# Patient Record
Sex: FEMALE
[De-identification: names, ages, dates, MRNs, and addresses within clinical notes are randomized per-mention and may not be internally consistent; named-entity substitution may affect disease eponyms.]

---

## 2022-01-24 ENCOUNTER — HOSPITAL ENCOUNTER (EMERGENCY)
Dept: HOSPITAL 5 - ED | Age: 32
Discharge: HOME | End: 2022-01-24
Payer: SELF-PAY

## 2022-01-24 VITALS — DIASTOLIC BLOOD PRESSURE: 81 MMHG | SYSTOLIC BLOOD PRESSURE: 145 MMHG

## 2022-01-24 DIAGNOSIS — J45.909: Primary | ICD-10-CM

## 2022-01-24 PROCEDURE — 99282 EMERGENCY DEPT VISIT SF MDM: CPT

## 2022-01-24 NOTE — EMERGENCY DEPARTMENT REPORT
ED Shortness of Breath HPI





- General


Chief Complaint: Dyspnea/Respdistress


Stated Complaint: SOB


Time Seen by Provider: 01/24/22 17:50


Source: patient


Mode of arrival: Ambulatory


Limitations: No Limitations





- History of Present Illness


Initial Comments: 





Patient is a 31-year-old F American female with past medical history of possible

bronchitis versus asthma who is presenting with 2 months of cough which is 

nonproductive and shortness of breath and some mild wheezing.  Symptoms occur 

intermittently but worsened today which prompted her to come to the hospital.  

She denies any fevers chills nausea vomiting diarrhea.  Patient states she did 

take a Covid test yesterday which was negative.  Patient states she no longer 

has an inhaler which did help her symptoms.





- Related Data


                                  Previous Rx's











 Medication  Instructions  Recorded  Last Taken  Type


 


Albuterol Mdi (or & Nicu Only) 2 puff IH QID PRN #1 inhalation 01/24/22 Unknown 

Rx





[ProAir HFA Inhaler]    


 


Benzonatate [Tessalon Perles] 100 mg PO Q8HR #10 capsule 01/24/22 Unknown Rx


 


predniSONE [Deltasone] 50 mg PO QDAY #5 tab 01/24/22 Unknown Rx











                                    Allergies











Allergy/AdvReac Type Severity Reaction Status Date / Time


 


No Known Allergies Allergy   Verified 01/24/22 16:31














ED Review of Systems


ROS: 


Stated complaint: SOB


Other details as noted in HPI





Comment: All other systems reviewed and negative





ED Past Medical Hx





- Past Medical History


Previous Medical History?: No





- Surgical History


Past Surgical History?: No





- Medications


Home Medications: 


                                Home Medications











 Medication  Instructions  Recorded  Confirmed  Last Taken  Type


 


Albuterol Mdi (or & Nicu Only) 2 puff IH QID PRN #1 inhalation 01/24/22  Unknown

 Rx





[ProAir HFA Inhaler]     


 


Benzonatate [Tessalon Perles] 100 mg PO Q8HR #10 capsule 01/24/22  Unknown Rx


 


predniSONE [Deltasone] 50 mg PO QDAY #5 tab 01/24/22  Unknown Rx














ED Physical Exam





- General


Limitations: No Limitations


General appearance: alert, in no apparent distress





- Head


Head exam: Present: atraumatic, normocephalic





- Eye


Eye exam: Present: normal appearance





- ENT


ENT exam: Present: mucous membranes moist





- Neck


Neck exam: Present: normal inspection





- Respiratory


Respiratory exam: Present: normal lung sounds bilaterally.  Absent: respiratory 

distress, wheezes, rales, rhonchi





- Cardiovascular


Cardiovascular Exam: Present: regular rate, normal rhythm.  Absent: systolic 

murmur, diastolic murmur, rubs, gallop





- GI/Abdominal


GI/Abdominal exam: Present: soft, normal bowel sounds





- Extremities Exam


Extremities exam: Present: normal inspection





- Back Exam


Back exam: Present: normal inspection





- Neurological Exam


Neurological exam: Present: alert, oriented X3





- Psychiatric


Psychiatric exam: Present: normal affect, normal mood





- Skin


Skin exam: Present: warm, dry, intact, normal color.  Absent: rash





ED Course





                                   Vital Signs











  01/24/22





  16:32


 


Temperature 98.3 F


 


Pulse Rate 112 H


 


Respiratory 16





Rate 


 


Blood Pressure 136/83





[Left] 


 


O2 Sat by Pulse 100





Oximetry 














ED Medical Decision Making





- Medical Decision Making





Patient lungs are clear at this time but will will prescribe albuterol inhaler 

and a short course of steroids for the patient discharged home.


Critical care attestation.: 


If time is entered above; I have spent that time in minutes in the direct care 

of this critically ill patient, excluding procedure time.








ED Disposition


Clinical Impression: 


Asthma


Qualifiers:


 Asthma severity: mild Asthma persistence: intermittent Asthma complication 

type: unspecified Qualified Code(s): J45.20 - Mild intermittent asthma, 

uncomplicated





Disposition: 01 HOME / SELF CARE / HOMELESS


Is pt being admited?: No


Does the pt Need Aspirin: No


Condition: Stable


Instructions:  Asthma (ED), Asthma, Adult


Referrals: 


[facility], [place] [Referring] - 3-5 Days


Time of Disposition: 18:07